# Patient Record
Sex: FEMALE | Race: WHITE | NOT HISPANIC OR LATINO | Employment: FULL TIME
[De-identification: names, ages, dates, MRNs, and addresses within clinical notes are randomized per-mention and may not be internally consistent; named-entity substitution may affect disease eponyms.]

---

## 2018-09-14 ENCOUNTER — HOSPITAL (OUTPATIENT)
Dept: OTHER | Age: 38
End: 2018-09-14
Attending: OBSTETRICS & GYNECOLOGY

## 2018-09-14 LAB
ANALYZER ANC (IANC): ABNORMAL
ERYTHROCYTE [DISTWIDTH] IN BLOOD: 13.6 % (ref 11–15)
HEMATOCRIT: 35.5 % (ref 36–46.5)
HGB BLD-MCNC: 11.4 GM/DL (ref 12–15.5)
MCH RBC QN AUTO: 29.2 PG (ref 26–34)
MCHC RBC AUTO-ENTMCNC: 32.1 GM/DL (ref 32–36.5)
MCV RBC AUTO: 91 FL (ref 78–100)
NRBC (NRBCRE): ABNORMAL
PLATELET # BLD: 171 THOUSAND/MCL (ref 140–450)
RBC # BLD: 3.9 MILLION/MCL (ref 4–5.2)
T PALLIDUM IGG SER QL IA: NONREACTIVE
WBC # BLD: 8.8 THOUSAND/MCL (ref 4.2–11)

## 2018-11-19 ENCOUNTER — HOSPITAL (OUTPATIENT)
Dept: OTHER | Age: 38
End: 2018-11-19
Attending: OBSTETRICS & GYNECOLOGY

## 2018-11-19 LAB
ALBUMIN SERPL-MCNC: 2.5 GM/DL (ref 3.6–5.1)
ALBUMIN/GLOB SERPL: 0.7 {RATIO} (ref 1–2.4)
ALP SERPL-CCNC: 190 UNIT/L (ref 45–117)
ALT SERPL-CCNC: 18 UNIT/L
ANALYZER ANC (IANC): ABNORMAL
ANION GAP SERPL CALC-SCNC: 15 MMOL/L (ref 10–20)
AST SERPL-CCNC: 10 UNIT/L
BILIRUB SERPL-MCNC: 0.3 MG/DL (ref 0.2–1)
BUN SERPL-MCNC: 8 MG/DL (ref 6–20)
BUN/CREAT SERPL: 14 (ref 7–25)
CALCIUM SERPL-MCNC: 8.8 MG/DL (ref 8.4–10.2)
CHLORIDE: 103 MMOL/L (ref 98–107)
CO2 SERPL-SCNC: 24 MMOL/L (ref 21–32)
CREAT SERPL-MCNC: 0.59 MG/DL (ref 0.51–0.95)
ERYTHROCYTE [DISTWIDTH] IN BLOOD: 13.4 % (ref 11–15)
GLOBULIN SER-MCNC: 3.7 GM/DL (ref 2–4)
GLUCOSE SERPL-MCNC: 71 MG/DL (ref 65–99)
HEMATOCRIT: 35.5 % (ref 36–46.5)
HGB BLD-MCNC: 11.8 GM/DL (ref 12–15.5)
HIV ANTIGEN/ANTIBODY SCREEN: NONREACTIVE
LENGTH OF FAST TIME PATIENT: 5 HR
MCH RBC QN AUTO: 29.9 PG (ref 26–34)
MCHC RBC AUTO-ENTMCNC: 33.2 GM/DL (ref 32–36.5)
MCV RBC AUTO: 89.9 FL (ref 78–100)
NRBC (NRBCRE): ABNORMAL
ORGANIC ACIDS/CREAT UR-SRTO: 42.08 MG/DL
PLATELET # BLD: 147 THOUSAND/MCL (ref 140–450)
POTASSIUM SERPL-SCNC: 4.3 MMOL/L (ref 3.4–5.1)
PROT SERPL-MCNC: 6.2 GM/DL (ref 6.4–8.2)
PROT UR-MCNC: <6 MG/DL
PROT/CREAT UR: NORMAL MG/GM CREAT
RBC # BLD: 3.95 MILLION/MCL (ref 4–5.2)
SODIUM SERPL-SCNC: 138 MMOL/L (ref 135–145)
URATE SERPL-MCNC: 4 MG/DL (ref 2.6–5.9)
WBC # BLD: 9 THOUSAND/MCL (ref 4.2–11)

## 2019-09-09 ENCOUNTER — HOSPITAL (OUTPATIENT)
Dept: OTHER | Age: 39
End: 2019-09-09
Attending: EMERGENCY MEDICINE

## 2020-01-08 ENCOUNTER — HOSPITAL (OUTPATIENT)
Dept: OTHER | Age: 40
End: 2020-01-08
Attending: EMERGENCY MEDICINE

## 2021-01-21 ENCOUNTER — HOSPITAL ENCOUNTER (OUTPATIENT)
Dept: MAMMOGRAPHY | Age: 41
Discharge: HOME | End: 2021-01-21
Attending: FAMILY MEDICINE

## 2021-01-21 DIAGNOSIS — Z12.31 ENCOUNTER FOR SCREENING MAMMOGRAM FOR MALIGNANT NEOPLASM OF BREAST: ICD-10-CM

## 2021-01-21 PROCEDURE — 77063 BREAST TOMOSYNTHESIS BI: CPT

## 2024-07-11 ENCOUNTER — APPOINTMENT (OUTPATIENT)
Dept: URBAN - METROPOLITAN AREA CLINIC 317 | Age: 44
Setting detail: DERMATOLOGY
End: 2024-07-11

## 2024-07-11 DIAGNOSIS — D22 MELANOCYTIC NEVI: ICD-10-CM

## 2024-07-11 DIAGNOSIS — D18.0 HEMANGIOMA: ICD-10-CM

## 2024-07-11 DIAGNOSIS — L73.8 OTHER SPECIFIED FOLLICULAR DISORDERS: ICD-10-CM

## 2024-07-11 DIAGNOSIS — L81.4 OTHER MELANIN HYPERPIGMENTATION: ICD-10-CM

## 2024-07-11 DIAGNOSIS — L82.0 INFLAMED SEBORRHEIC KERATOSIS: ICD-10-CM

## 2024-07-11 PROBLEM — D22.0 MELANOCYTIC NEVI OF LIP: Status: ACTIVE | Noted: 2024-07-11

## 2024-07-11 PROBLEM — D18.01 HEMANGIOMA OF SKIN AND SUBCUTANEOUS TISSUE: Status: ACTIVE | Noted: 2024-07-11

## 2024-07-11 PROCEDURE — OTHER SUNSCREEN RECOMMENDATIONS: OTHER

## 2024-07-11 PROCEDURE — OTHER COUNSELING: OTHER

## 2024-07-11 PROCEDURE — 17110 DESTRUCT B9 LESION 1-14: CPT

## 2024-07-11 PROCEDURE — 99203 OFFICE O/P NEW LOW 30 MIN: CPT | Mod: 25

## 2024-07-11 PROCEDURE — OTHER MIPS QUALITY: OTHER

## 2024-07-11 PROCEDURE — OTHER LIQUID NITROGEN: OTHER

## 2024-07-11 ASSESSMENT — LOCATION DETAILED DESCRIPTION DERM
LOCATION DETAILED: EPIGASTRIC SKIN
LOCATION DETAILED: RIGHT INFERIOR MEDIAL UPPER BACK
LOCATION DETAILED: LEFT MEDIAL SUPERIOR CHEST
LOCATION DETAILED: LEFT INFERIOR MEDIAL UPPER BACK
LOCATION DETAILED: LEFT INFERIOR UPPER BACK
LOCATION DETAILED: RIGHT NASOLABIAL FOLD
LOCATION DETAILED: RIGHT CENTRAL MALAR CHEEK
LOCATION DETAILED: LEFT INFERIOR CENTRAL MALAR CHEEK
LOCATION DETAILED: LEFT UPPER CUTANEOUS LIP

## 2024-07-11 ASSESSMENT — LOCATION SIMPLE DESCRIPTION DERM
LOCATION SIMPLE: LEFT UPPER BACK
LOCATION SIMPLE: RIGHT UPPER BACK
LOCATION SIMPLE: LEFT LIP
LOCATION SIMPLE: RIGHT LIP
LOCATION SIMPLE: ABDOMEN
LOCATION SIMPLE: CHEST
LOCATION SIMPLE: RIGHT CHEEK
LOCATION SIMPLE: LEFT CHEEK

## 2024-07-11 ASSESSMENT — LOCATION ZONE DERM
LOCATION ZONE: TRUNK
LOCATION ZONE: FACE
LOCATION ZONE: LIP

## 2024-07-11 NOTE — PROCEDURE: LIQUID NITROGEN
Add 52 Modifier (Optional): no
Medical Necessity Clause: This procedure was medically necessary because the lesions that were treated were:
Consent: The patient's consent was obtained including but not limited to risks of crusting, scabbing, blistering, scarring, darker or lighter pigmentary change, recurrence, incomplete removal and infection.
Spray Paint Text: The liquid nitrogen was applied to the skin utilizing a spray paint frosting technique.
Show Spray Paint Technique Variable?: Yes
Post-Care Instructions: I reviewed with the patient in detail post-care instructions. Patient is to wear sunprotection, and avoid picking at any of the treated lesions. Pt may apply Vaseline to crusted or scabbing areas.
Medical Necessity Information: It is in your best interest to select a reason for this procedure from the list below. All of these items fulfill various CMS LCD requirements except the new and changing color options.
Duration Of Freeze Thaw-Cycle (Seconds): 5-10
Number Of Freeze-Thaw Cycles: 3 freeze-thaw cycles
Detail Level: Detailed